# Patient Record
Sex: MALE | Race: WHITE | NOT HISPANIC OR LATINO | URBAN - METROPOLITAN AREA
[De-identification: names, ages, dates, MRNs, and addresses within clinical notes are randomized per-mention and may not be internally consistent; named-entity substitution may affect disease eponyms.]

---

## 2018-09-05 ENCOUNTER — TELEPHONE (OUTPATIENT)
Dept: CARDIOLOGY | Facility: CLINIC | Age: 58
End: 2018-09-05

## 2018-09-05 NOTE — TELEPHONE ENCOUNTER
09/05/18  1:02 PM  Cody Ferrer  1960    Home Phone [phone]       Cody Ferrer is a past patient of Dr Valentine.  Faxed EKG received from [facility] in [place].  They are asking for Dr Valentine to do an over read and advise.    Tried calling [phone] with no answer and no voicemail.  Faxed Dr Valentine's recommendation to [phone] with instruction that patient didn't need to go to the ER, but Dr Valentine would like him to make an apt.  Patient has not been seen since 2015.    Rubi Sunshine RN  Triage nurse

## 2018-09-06 ENCOUNTER — OFFICE VISIT (OUTPATIENT)
Dept: CARDIOLOGY | Facility: CLINIC | Age: 58
End: 2018-09-06

## 2018-09-06 VITALS — HEIGHT: 72 IN

## 2018-09-06 VITALS
BODY MASS INDEX: 29.26 KG/M2 | SYSTOLIC BLOOD PRESSURE: 136 MMHG | DIASTOLIC BLOOD PRESSURE: 70 MMHG | WEIGHT: 216 LBS | HEART RATE: 91 BPM | HEIGHT: 72 IN

## 2018-09-06 DIAGNOSIS — I48.91 ATRIAL FIBRILLATION, UNSPECIFIED TYPE (HCC): Primary | ICD-10-CM

## 2018-09-06 DIAGNOSIS — I48.3 TYPICAL ATRIAL FLUTTER (HCC): ICD-10-CM

## 2018-09-06 PROCEDURE — 93000 ELECTROCARDIOGRAM COMPLETE: CPT | Performed by: INTERNAL MEDICINE

## 2018-09-06 PROCEDURE — 99204 OFFICE O/P NEW MOD 45 MIN: CPT | Performed by: INTERNAL MEDICINE

## 2018-09-06 RX ORDER — LISINOPRIL 10 MG/1
10 TABLET ORAL 2 TIMES DAILY
COMMUNITY

## 2018-09-06 RX ORDER — ATENOLOL 50 MG/1
50 TABLET ORAL DAILY
COMMUNITY
End: 2019-12-20 | Stop reason: ALTCHOICE

## 2018-09-06 RX ORDER — ESOMEPRAZOLE MAGNESIUM 40 MG/1
40 CAPSULE, DELAYED RELEASE ORAL
COMMUNITY

## 2018-09-06 NOTE — PROGRESS NOTES
Subjective:     Encounter Date:09/06/2018      Patient ID: Cody Ferrer is a 58 y.o. male.    Chief Complaint: atrial fibrillation/flutter    History of Present Illness    Dear Dr. Smith,     I had the pleasure of seeing Cody Fields in cardiac evaluation today.  As you well know, he is a terry 58-year-old male whom I saw several years ago for PVCs.  He is a retired teacher.  He has some hypertension but is otherwise pretty healthy.  He drinks one or two alcoholic beverages a few nights a week.      He was at a health fair and was noticed to have an irregular pulse.  An EKG was performed and he was advised to go to the emergency room.  There he was discovered to be in atrial flutter.      He comes in to see me.  In general he is doing well.  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  He is getting ready for his son's wedding.  He states he gets palpitations at night but they do not really bother him.  He takes an aspirin daily already.          Review of Systems   All other systems reviewed and are negative.    Family History   Problem Relation Age of Onset   • Heart attack Father    • Hypertension Father    • Cancer Father      Social History   Substance Use Topics   • Smoking status: Never Smoker   • Smokeless tobacco: Not on file   • Alcohol use Yes         ECG 12 Lead  Date/Time: 9/6/2018 10:51 AM  Performed by: TORITO GRIFFITH  Authorized by: TORITO GRIFFITH   Previous ECG: no previous ECG available  Rhythm: atrial flutter and atrial fibrillation  BPM: 91                 Objective:     Physical Exam   Constitutional: He is oriented to person, place, and time. He appears well-developed and well-nourished.   HENT:   Head: Normocephalic and atraumatic.   Neck: Normal range of motion. Neck supple.   Cardiovascular: Normal rate and normal heart sounds.  An irregularly irregular rhythm present.   Pulmonary/Chest: Effort normal and breath sounds normal.   Abdominal: Soft. Bowel sounds are  normal.   Musculoskeletal: Normal range of motion.   Neurological: He is alert and oriented to person, place, and time.   Skin: Skin is warm and dry.   Psychiatric: He has a normal mood and affect. His behavior is normal. Thought content normal.   Vitals reviewed.      Lab Review:       Assessment:          Diagnosis Plan   1. Atrial fibrillation, unspecified type (CMS/HCC)     2. Typical atrial flutter (CMS/HCC)            Plan:       It was a pleasure to see your patient today in cardiac evaluation.  He is a terry 58-year-old male with atrial fibrillation/flutter.  I spoke with him about options for care.  I spend over 60 minutes in direct contact with the patient regarding his options.  For now, he would like to think about things.  He will contact me if he decides to go on anticoagulation.  After that, we can decide about other options for treatment.      Atrial Fibrillation and Atrial Flutter  Assessment  • The patient has atrial fibrillation-initial episode and atrial flutter-initial episode  • This is non-valvular in etiology  • The patient's CHADS2-VASc score is 1  • A [id number] score of 1 is considered an intermediate risk for a thromboembolic event  • Aspirin prescribed    Plan  • Continue aspirin for antithrombotic therapy, bleeding issues discussed

## 2018-12-13 ENCOUNTER — OFFICE VISIT (OUTPATIENT)
Dept: CARDIOLOGY | Facility: CLINIC | Age: 58
End: 2018-12-13

## 2018-12-13 VITALS
HEART RATE: 98 BPM | HEIGHT: 72 IN | DIASTOLIC BLOOD PRESSURE: 84 MMHG | WEIGHT: 220 LBS | SYSTOLIC BLOOD PRESSURE: 136 MMHG | BODY MASS INDEX: 29.8 KG/M2

## 2018-12-13 DIAGNOSIS — I48.19 PERSISTENT ATRIAL FIBRILLATION (HCC): ICD-10-CM

## 2018-12-13 DIAGNOSIS — I48.3 TYPICAL ATRIAL FLUTTER (HCC): Primary | ICD-10-CM

## 2018-12-13 PROCEDURE — 93000 ELECTROCARDIOGRAM COMPLETE: CPT | Performed by: INTERNAL MEDICINE

## 2018-12-13 PROCEDURE — 99213 OFFICE O/P EST LOW 20 MIN: CPT | Performed by: INTERNAL MEDICINE

## 2018-12-13 NOTE — PROGRESS NOTES
Subjective:     Encounter Date:12/13/2018      Patient ID: Cody Ferrer is a 58 y.o. male.    Chief Complaint: AF/flutter    History of Present Illness    Dear Dr. mSith,     I had the pleasure of seeing Cody Ferrer in cardiac follow-up today.  As you well know, he is a terry, 58-year-old man with a history of atrial fibrillation/flutter.  He was diagnosed several months ago at a health fair, though he may have had the rhythm for much longer than that.  He was anticoagulated with apixaban and really has done well with that.  He has had no bleeding complications.  He has no symptoms of palpitations unless he really tries to pay attention.  Overall, he has got a great quality of life without any limitation.                   Review of Systems   All other systems reviewed and are negative.        ECG 12 Lead  Date/Time: 12/13/2018 1:04 PM  Performed by: Amando Valentine MD  Authorized by: Amando Valentine MD   Comparison: compared with previous ECG   Similar to previous ECG  Rhythm: atrial flutter and atrial fibrillation  BPM: 98                 Objective:     Physical Exam   Constitutional: He is oriented to person, place, and time. He appears well-developed and well-nourished.   HENT:   Head: Normocephalic and atraumatic.   Neck: Normal range of motion. Neck supple.   Cardiovascular: Normal rate and normal heart sounds. An irregularly irregular rhythm present.   Pulmonary/Chest: Effort normal and breath sounds normal.   Abdominal: Soft. Bowel sounds are normal.   Musculoskeletal: Normal range of motion.   Neurological: He is alert and oriented to person, place, and time.   Skin: Skin is warm and dry.   Psychiatric: He has a normal mood and affect. His behavior is normal. Thought content normal.   Vitals reviewed.      Lab Review:       Assessment:          Diagnosis Plan   1. Typical atrial flutter (CMS/HCC)     2. Persistent atrial fibrillation (CMS/HCC)            Plan:       It was a pleasure to see your patient in  cardiac followup today.  He is doing well from the cardiac standpoint without any complaints.  We talked about the range of possible interventions for atrial fibrillation and flutter.  He is okay with staying on anticoagulation.  I think his lifestyle is pretty good so I do not think additional modifications need to be made.  He could undergo cardioversion and then we will see if his atrial fibrillation recurs.  We also talked about the role of antiarrhythmic medications as well as ablation.      For now, he would like to do nothing further as he is asymptomatic.  He will see me again in six months or sooner if symptoms warrant.          Atrial Fibrillation and Atrial Flutter  Assessment  • The patient has persistent atrial fibrillation and atrial flutter-initial episode  • This is non-valvular in etiology  • The patient's CHADS2-VASc score is 1  • A [id number] score of 1 is considered an intermediate risk for a thromboembolic event  • Aspirin prescribed    Plan  • Continue in atrial fibrillation with rate control  • Continue aspirin for antithrombotic therapy, bleeding issues discussed  • Continue beta blocker for rate control

## 2019-04-05 ENCOUNTER — TELEPHONE (OUTPATIENT)
Dept: CARDIOLOGY | Facility: CLINIC | Age: 59
End: 2019-04-05

## 2019-04-05 NOTE — TELEPHONE ENCOUNTER
Patient called stating that he now has a copay card for Eliquis and it will only cost $10/month. He requested a new RX be sent to [facility] in [facility].     RX sent to pharmacy.

## 2019-05-17 ENCOUNTER — TELEPHONE (OUTPATIENT)
Dept: CARDIOLOGY | Facility: CLINIC | Age: 59
End: 2019-05-17

## 2019-05-17 NOTE — TELEPHONE ENCOUNTER
"05/17/19  9:30 AM  Cody Ferrer  1960  Home Phone [phone]       Cody Ferrer is a patient of Dr. Valentine, calling in w/c/o hypertension, L jaw pain (\"feels like his teeth are not lined up correctly\"), and a \"terrible HA\".  Current BP are 186/120 and 154/101. He did not have the HR readings w/these BPs.    Current cardiac meds are:    Atenolol 50mg daily  Lisinopril 10mg daily  Apixaban 5mg BID    Pt denies any edema or chest pain.    He is a [facility] pt, and wants to come down here, if he needs to go to the ER.    Avis: Can you please address this for Dr. Valentine? How would you like to proceed?    Mary Kramer  Triage RN    "

## 2019-05-17 NOTE — TELEPHONE ENCOUNTER
I spoke with him.  He has not taken his lisinopril this morning, he usually takes it in the evening.  I asked him to go ahead and take his lisinopril.  He is going to be seeing his primary care physician in 30 minutes.

## 2019-05-28 NOTE — TELEPHONE ENCOUNTER
EKG faxed over from [facility].  I guess they did one at PCP office.  I have placed in your INBOX for review.    Let me know if there's anything I need to do.    Thanks!

## 2019-06-13 ENCOUNTER — OFFICE VISIT (OUTPATIENT)
Dept: CARDIOLOGY | Facility: CLINIC | Age: 59
End: 2019-06-13

## 2019-06-13 VITALS
WEIGHT: 221 LBS | SYSTOLIC BLOOD PRESSURE: 150 MMHG | HEIGHT: 72 IN | BODY MASS INDEX: 29.93 KG/M2 | HEART RATE: 99 BPM | DIASTOLIC BLOOD PRESSURE: 90 MMHG

## 2019-06-13 DIAGNOSIS — I48.3 TYPICAL ATRIAL FLUTTER (HCC): ICD-10-CM

## 2019-06-13 DIAGNOSIS — I48.19 PERSISTENT ATRIAL FIBRILLATION (HCC): Primary | ICD-10-CM

## 2019-06-13 PROCEDURE — 93000 ELECTROCARDIOGRAM COMPLETE: CPT | Performed by: INTERNAL MEDICINE

## 2019-06-13 PROCEDURE — 99213 OFFICE O/P EST LOW 20 MIN: CPT | Performed by: INTERNAL MEDICINE

## 2019-06-13 RX ORDER — ATENOLOL 50 MG/1
100 TABLET ORAL 2 TIMES DAILY
COMMUNITY
End: 2020-12-04 | Stop reason: SDUPTHER

## 2019-06-13 NOTE — PROGRESS NOTES
Subjective:     Encounter Date:06/13/2019      Patient ID: Cody Ferrer is a 59 y.o. male.    Chief Complaint: AF/flutter    History of Present Illness    Dear Dr. Smith,    I had the pleasure of seeing the patient in cardiac followup today. As you well know, he is a terry 59-year-old man with history of chronic atrial fibrillation/flutter. He takes anticoagulation without difficulty for this.     Recently he had an episode of transient high blood pressure. He increased the dose of his beta blocker which helped quite a bit.    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         Review of Systems   All other systems reviewed and are negative.        ECG 12 Lead  Date/Time: 6/13/2019 10:26 AM  Performed by: Amando Valentine MD  Authorized by: Amando Valentine MD   Comparison: compared with previous ECG   Similar to previous ECG  Rhythm: atrial fibrillation  BPM: 99  Other findings: early transition               Objective:     Physical Exam   Constitutional: He is oriented to person, place, and time. He appears well-developed and well-nourished.   HENT:   Head: Normocephalic and atraumatic.   Neck: Normal range of motion. Neck supple.   Cardiovascular: Normal rate and normal heart sounds. An irregularly irregular rhythm present.   Pulmonary/Chest: Effort normal and breath sounds normal.   Abdominal: Soft. Bowel sounds are normal.   Musculoskeletal: Normal range of motion.   Neurological: He is alert and oriented to person, place, and time.   Skin: Skin is warm and dry.   Psychiatric: He has a normal mood and affect. His behavior is normal. Thought content normal.   Vitals reviewed.      Lab Review:       Assessment:          Diagnosis Plan   1. Persistent atrial fibrillation (CMS/HCC)     2. Typical atrial flutter (CMS/HCC)            Plan:       It was a pleasure to see your patient in cardiac followup today. He is doing quite well from the cardiac standpoint without any complaints. His blood pressure is  much better controlled. I have recommended no changes. He will see us again in 6 months or sooner if symptoms warrant.         Atrial Fibrillation and Atrial Flutter  Assessment  • The patient has persistent atrial fibrillation and atrial flutter-initial episode  • This is non-valvular in etiology  • The patient's CHADS2-VASc score is 1  • A [id number]-[id number] score of 1 is considered an intermediate risk for a thromboembolic event  • Aspirin prescribed    Plan  • Continue in atrial fibrillation with rate control  • Continue aspirin for antithrombotic therapy, bleeding issues discussed  • Continue beta blocker for rate control

## 2019-10-23 RX ORDER — APIXABAN 5 MG/1
TABLET, FILM COATED ORAL
Qty: 180 TABLET | Refills: 0 | Status: SHIPPED | OUTPATIENT
Start: 2019-10-23 | End: 2019-12-20 | Stop reason: SDUPTHER

## 2019-12-03 ENCOUNTER — TELEPHONE (OUTPATIENT)
Dept: CARDIOLOGY | Facility: CLINIC | Age: 59
End: 2019-12-03

## 2019-12-03 NOTE — TELEPHONE ENCOUNTER
Patient called today, stating that he was recently at [facility] for blood pressure issues and some numbness in his hands.  He would like for you to review testing and call to discuss.    Records from [facility] have been printed and placed in your INBOX for review.    Thanks!

## 2019-12-03 NOTE — TELEPHONE ENCOUNTER
Called and went over work up.  He wonders if hydralazine caused his presenting symptoms.  I asked him to hold off on taking any further hydralazine, continue with lisinopril and atenolol for now, and keep a BP log until he sees me in the office on 12/20.

## 2019-12-20 ENCOUNTER — OFFICE VISIT (OUTPATIENT)
Dept: CARDIOLOGY | Facility: CLINIC | Age: 59
End: 2019-12-20

## 2019-12-20 VITALS
HEIGHT: 72 IN | WEIGHT: 222 LBS | DIASTOLIC BLOOD PRESSURE: 108 MMHG | BODY MASS INDEX: 30.07 KG/M2 | HEART RATE: 67 BPM | SYSTOLIC BLOOD PRESSURE: 162 MMHG

## 2019-12-20 DIAGNOSIS — I49.3 BEAT, PREMATURE VENTRICULAR: ICD-10-CM

## 2019-12-20 DIAGNOSIS — I48.0 PAROXYSMAL ATRIAL FIBRILLATION (HCC): Primary | ICD-10-CM

## 2019-12-20 DIAGNOSIS — I10 ESSENTIAL HYPERTENSION: ICD-10-CM

## 2019-12-20 DIAGNOSIS — Z79.01 CHRONIC ANTICOAGULATION: ICD-10-CM

## 2019-12-20 PROBLEM — R94.39 ABNORMAL CARDIOVASCULAR STRESS TEST: Status: RESOLVED | Noted: 2019-12-20 | Resolved: 2019-12-20

## 2019-12-20 PROBLEM — R94.39 ABNORMAL CARDIOVASCULAR STRESS TEST: Status: ACTIVE | Noted: 2019-12-20

## 2019-12-20 PROCEDURE — 99214 OFFICE O/P EST MOD 30 MIN: CPT | Performed by: INTERNAL MEDICINE

## 2019-12-20 PROCEDURE — 93000 ELECTROCARDIOGRAM COMPLETE: CPT | Performed by: INTERNAL MEDICINE

## 2019-12-20 RX ORDER — HYDRALAZINE HYDROCHLORIDE 100 MG/1
TABLET, FILM COATED ORAL
COMMUNITY
Start: 2019-11-27 | End: 2019-12-20 | Stop reason: ALTCHOICE

## 2019-12-20 NOTE — PROGRESS NOTES
Subjective:     Encounter Date:12/20/2019      Patient ID: Cody Ferrer is a 59 y.o. male.    Chief Complaint:  History of Present Illness    This is a 59-year-old with a history of paroxysmal atrial fibrillation, hypertension, who presents for follow-up.    Patient was previously followed by Dr. Valentine.  He 2 in 8/2014 when he presented for symptoms of palpitations.  He had a Holter monitor performed around that time that showed frequent PVCs.  He ultimately underwent an echocardiogram and a stress test that were both unremarkable.  Between 6/2015 and 9/2018 the patient was not seen again in our office.  He saw Dr. Valentine again in 9/2018 after an EKG performed at a health [facility] revealed evidence of atrial fibrillation.  Patient was relatively asymptomatic except for some occasional palpitations at night.  He was already rate controlled on atenolol.  He was started on apixaban for anticoagulation.  He was seen 2 additional times in 12/2018 and then again in 6/2019 and was noted to be in atrial fibrillation each of those times.  They did discuss several different options for treatment of his atrial fibrillation including at his most recent office visit with Dr. Valentine in 6/2019 but opted to continue with rate control and anticoagulation since he was asymptomatic.    He presents today for routine follow-up.  Earlier this month the patient was admitted to [facility] after having symptoms of dyspnea on exertion and flushing feeling.  He stated that he was started on hydralazine for uncontrolled blood pressures.  His work-up during his hospitalization was unremarkable including an echocardiogram that showed systolic function and wall motion with an estimated ejection fraction of 55 to 60%, no significant valvular disease, and normal diastolic function.  His stress test was negative for ischemia.  It was felt that his symptoms may have been 2 reaction to hydralazine.  He is remained off of that medication since.  He  has been monitoring his blood pressures at home and his systolic pressures have been ranging from the 130s to 150s with diastolics in the 70s 80s.  For the most part his blood pressures appear to be running in the 130s over 80s.  He does report that he took his blood pressure cuff into ' run higher than the office cuff.  He denies any further symptoms since his discharge from the hospital.  He denies any recent weight gain.  Denies any chest pain, dyspnea on exertion, orthopnea, near-syncope or syncope, or lower extremity edema.  On occasion he has some palpitations at night but this is chronic and unchanged.  He and his wife are taking a trip to [place] and [place] early next month.  He denies any bleeding issues.    Review of Systems   Constitution: Negative for malaise/fatigue.   HENT: Negative for hearing loss, hoarse voice, nosebleeds and sore throat.    Eyes: Negative for pain.   Cardiovascular: Negative for chest pain, claudication, cyanosis, dyspnea on exertion, irregular heartbeat, leg swelling, near-syncope, orthopnea, palpitations, paroxysmal nocturnal dyspnea and syncope.   Respiratory: Negative for shortness of breath and snoring.    Endocrine: Negative for cold intolerance, heat intolerance, polydipsia, polyphagia and polyuria.   Skin: Negative for itching and rash.   Musculoskeletal: Negative for arthritis, falls, joint pain, joint swelling, muscle cramps, muscle weakness and myalgias.   Gastrointestinal: Negative for constipation, diarrhea, dysphagia, heartburn, hematemesis, hematochezia, melena, nausea and vomiting.   Genitourinary: Negative for frequency, hematuria and hesitancy.   Neurological: Negative for excessive daytime sleepiness, dizziness, headaches, light-headedness, numbness and weakness.   Psychiatric/Behavioral: Negative for depression. The patient is not nervous/anxious.          Current Outpatient Medications:   •  apixaban (ELIQUIS) 5 MG tablet tablet, Take 1 tablet  "by mouth Every 12 (Twelve) Hours., Disp: 180 tablet, Rfl: 2  •  atenolol (TENORMIN) 50 MG tablet, Take 100 mg by mouth 2 (Two) Times a Day., Disp: , Rfl:   •  esomeprazole (nexIUM) 40 MG capsule, Take 40 mg by mouth Every Morning Before Breakfast., Disp: , Rfl:   •  lisinopril (PRINIVIL,ZESTRIL) 10 MG tablet, Take 20 mg by mouth 2 (Two) Times a Day., Disp: , Rfl:     Past Medical History:   Diagnosis Date   • HTN (hypertension)    • Hypertension        Past Surgical History:   Procedure Laterality Date   • HERNIA REPAIR     • HERNIA REPAIR      as a child       Family History   Problem Relation Age of Onset   • Heart attack Father    • Hypertension Father    • Cancer Father        Social History     Tobacco Use   • Smoking status: Never Smoker   Substance Use Topics   • Alcohol use: Yes   • Drug use: No         ECG 12 Lead  Date/Time: 12/20/2019 12:16 PM  Performed by: Paola Howard MD  Authorized by: Paola Howard MD   Comparison: compared with previous ECG   Comparison to previous ECG: Atrial fibrillation is no longer present  Rhythm: sinus rhythm  Ectopy: atrial premature contractions  Comments: Borderline prolonged QT               Objective:     Visit Vitals  BP (!) 162/108   Pulse 67   Ht 182.9 cm (72\")   Wt 101 kg (222 lb)   BMI 30.11 kg/m²         Physical Exam   Constitutional: He is oriented to person, place, and time. He appears well-developed and well-nourished.   HENT:   Head: Normocephalic and atraumatic.   Neck: No JVD present. Carotid bruit is not present.   Cardiovascular: Normal rate, regular rhythm, S1 normal and S2 normal. Exam reveals no gallop.   No murmur heard.  Pulses:       Radial pulses are 2+ on the right side, and 2+ on the left side.   No bilateral lower extremity edema   Pulmonary/Chest: Effort normal and breath sounds normal.   Abdominal: Soft. Normal appearance.   Neurological: He is alert and oriented to person, place, and time.   Skin: Skin is warm, dry and intact. "   Psychiatric: He has a normal mood and affect.       Lab Review:       Assessment:          Diagnosis Plan   1. Paroxysmal atrial fibrillation (CMS/HCC)  apixaban (ELIQUIS) 5 MG tablet tablet   2. Essential hypertension     3. Beat, premature ventricular     4. Chronic anticoagulation            Plan:       1.  Hypertension.  Blood pressures are elevated in the office today.  At home his blood pressures do not appear quite as bad.  Since for the most part his blood pressures appear to be running in the 130s over 80s at home I like to just monitor his blood pressures on his current regimen.  If his blood pressures continue to be an issue I would consider trying amlodipine next.  2.  Paroxysmal atrial fibrillation.  Interestingly the patient was felt to be in persistent atrial fibrillation earlier this year since he had 3 different office visits over the course of 9 months at which each time he was noted to be in atrial fibrillation.  However today he appears to be in sinus rhythm with frequent PACs and this is similar to his EKGs during his recent admission earlier this month.  He continues to have a CHADS-VASc score of 1.  For now I would recommend that he continue with the apixaban for anticoagulation.  We can readdress this in the future as needed.  3.  History of PVCs.    We will plan on seeing the patient back again in 3 months.

## 2020-06-05 ENCOUNTER — OFFICE VISIT (OUTPATIENT)
Dept: CARDIOLOGY | Facility: CLINIC | Age: 60
End: 2020-06-05

## 2020-06-05 VITALS
HEIGHT: 72 IN | HEART RATE: 67 BPM | DIASTOLIC BLOOD PRESSURE: 100 MMHG | OXYGEN SATURATION: 98 % | WEIGHT: 220 LBS | BODY MASS INDEX: 29.8 KG/M2 | SYSTOLIC BLOOD PRESSURE: 150 MMHG

## 2020-06-05 DIAGNOSIS — I49.3 BEAT, PREMATURE VENTRICULAR: ICD-10-CM

## 2020-06-05 DIAGNOSIS — Z79.01 CHRONIC ANTICOAGULATION: ICD-10-CM

## 2020-06-05 DIAGNOSIS — I48.0 PAROXYSMAL ATRIAL FIBRILLATION (HCC): Primary | ICD-10-CM

## 2020-06-05 DIAGNOSIS — I10 ESSENTIAL HYPERTENSION: ICD-10-CM

## 2020-06-05 PROCEDURE — 93000 ELECTROCARDIOGRAM COMPLETE: CPT | Performed by: INTERNAL MEDICINE

## 2020-06-05 PROCEDURE — 99214 OFFICE O/P EST MOD 30 MIN: CPT | Performed by: INTERNAL MEDICINE

## 2020-06-05 RX ORDER — TAMSULOSIN HYDROCHLORIDE 0.4 MG/1
0.04 CAPSULE ORAL DAILY
COMMUNITY
Start: 2020-05-11

## 2020-06-05 NOTE — PROGRESS NOTES
Subjective:     Encounter Date:06/05/2020      Patient ID: Cody Ferrer is a 60 y.o. male.    Chief Complaint:  History of Present Illness    This is a 60-year-old with a history of paroxysmal atrial fibrillation, hypertension, who presents for follow-up.    He presents today for routine follow-up.  He denies any recent episodes of atrial fibrillation that he is aware of.  He reports that his pressures at home have remained in the 130s over 80s.  When he was seen by Dr. Smith recently he was noted to have elevated blood pressures and was also complaining about issues with urination so he was started on tamsulosin to see if this would help with both his prostate issues and his blood pressures.  He reports that he has not noticed any significant change in his blood pressures with the addition of tamsulosin.  He continues to exercise without any significant issues.  He denies any chest pain, shortness of breath, orthopnea, palpitations, near-syncope or syncope, or lower extremity edema.      Prior History:  The patient was previously followed by Dr. Valentine.  He 2 in 8/2014 when he presented for symptoms of palpitations.  He had a Holter monitor performed around that time that showed frequent PVCs.  He ultimately underwent an echocardiogram and a stress test that were both unremarkable.  Between 6/2015 and 9/2018 the patient was not seen again in our office.  He saw Dr. Valentine again in 9/2018 after an EKG performed at a health [facility] revealed evidence of atrial fibrillation.  Patient was relatively asymptomatic except for some occasional palpitations at night.  He was already rate controlled on atenolol.  He was started on apixaban for anticoagulation.  He was seen 2 additional times in 12/2018 and then again in 6/2019 and was noted to be in atrial fibrillation each of those times.  They did discuss several different options for treatment of his atrial fibrillation including at his most recent office visit with Dr. Valentine in  6/2019 but opted to continue with rate control and anticoagulation since he was asymptomatic.     In 12/2019 he was admitted to [facility] after having symptoms of dyspnea on exertion and flushing feeling.  He stated that he was started on hydralazine for uncontrolled blood pressures.  His work-up during his hospitalization was unremarkable including an echocardiogram that showed systolic function and wall motion with an estimated ejection fraction of 55 to 60%, no significant valvular disease, and normal diastolic function.  His stress test was negative for ischemia.  It was felt that his symptoms were secondary to a reaction to hydralazine.  The medication was stopped at that time and his blood pressures were doing well at home.     Review of Systems   Constitution: Negative for malaise/fatigue.   HENT: Negative for hearing loss, hoarse voice, nosebleeds and sore throat.    Eyes: Negative for pain.   Cardiovascular: Negative for chest pain, claudication, cyanosis, dyspnea on exertion, irregular heartbeat, leg swelling, near-syncope, orthopnea, palpitations, paroxysmal nocturnal dyspnea and syncope.   Respiratory: Negative for shortness of breath and snoring.    Endocrine: Negative for cold intolerance, heat intolerance, polydipsia, polyphagia and polyuria.   Skin: Negative for itching and rash.   Musculoskeletal: Negative for arthritis, falls, joint pain, joint swelling, muscle cramps, muscle weakness and myalgias.   Gastrointestinal: Negative for constipation, diarrhea, dysphagia, heartburn, hematemesis, hematochezia, melena, nausea and vomiting.   Genitourinary: Negative for frequency, hematuria and hesitancy.   Neurological: Negative for excessive daytime sleepiness, dizziness, headaches, light-headedness, numbness and weakness.   Psychiatric/Behavioral: Negative for depression. The patient is not nervous/anxious.          Current Outpatient Medications:   •  apixaban (ELIQUIS) 5 MG tablet tablet,  "Take 1 tablet by mouth Every 12 (Twelve) Hours., Disp: 180 tablet, Rfl: 2  •  atenolol (TENORMIN) 50 MG tablet, Take 100 mg by mouth 2 (Two) Times a Day., Disp: , Rfl:   •  esomeprazole (nexIUM) 40 MG capsule, Take 40 mg by mouth Every Morning Before Breakfast., Disp: , Rfl:   •  lisinopril (PRINIVIL,ZESTRIL) 10 MG tablet, Take 20 mg by mouth 2 (Two) Times a Day., Disp: , Rfl:     Past Medical History:   Diagnosis Date   • HTN (hypertension)    • Hypertension        Past Surgical History:   Procedure Laterality Date   • HERNIA REPAIR     • HERNIA REPAIR      as a child       Family History   Problem Relation Age of Onset   • Heart attack Father    • Hypertension Father    • Cancer Father        Social History     Tobacco Use   • Smoking status: Never Smoker   Substance Use Topics   • Alcohol use: Yes   • Drug use: No         ECG 12 Lead  Date/Time: 6/5/2020 1:00 PM  Performed by: Paola Howard MD  Authorized by: Paola Howard MD   Comparison: compared with previous ECG   Similar to previous ECG  Rhythm: sinus rhythm               Objective:     Visit Vitals  /100 (BP Location: Left arm, Patient Position: Sitting)   Pulse 67   Ht 182.9 cm (72\")   Wt 99.8 kg (220 lb)   SpO2 98%   BMI 29.84 kg/m²         Physical Exam   Constitutional: He is oriented to person, place, and time. He appears well-developed and well-nourished.   HENT:   Head: Normocephalic and atraumatic.   Neck: No JVD present. Carotid bruit is not present.   Cardiovascular: Normal rate, regular rhythm, S1 normal and S2 normal. Exam reveals no gallop.   No murmur heard.  Pulses:       Radial pulses are 2+ on the right side, and 2+ on the left side.   Pulmonary/Chest: Effort normal and breath sounds normal.   Abdominal: Soft. Normal appearance.   Musculoskeletal: He exhibits no edema.   Neurological: He is alert and oriented to person, place, and time.   Skin: Skin is warm, dry and intact.   Psychiatric: He has a normal mood and affect.         "   Assessment:          Diagnosis Plan   1. Paroxysmal atrial fibrillation (CMS/HCC)     2. Essential hypertension     3. Beat, premature ventricular     4. Chronic anticoagulation            Plan:       1.  Paroxysmal atrial fibrillation.  No recent episodes.  He was last known to be in atrial fibrillation last year.  Continue management with atenolol and anticoagulation with apixaban.  2.  Hypertension.  Elevated in the office but it sounds like it is well controlled at home.  He appears to have some whitecoat hypertension.  Continue current management.  3.  PVCs.  None present on his EKG today.    We will plan on seeing the patient back again in 6 months.

## 2020-11-17 DIAGNOSIS — I48.0 PAROXYSMAL ATRIAL FIBRILLATION (HCC): ICD-10-CM

## 2020-11-17 RX ORDER — APIXABAN 5 MG/1
TABLET, FILM COATED ORAL
Qty: 180 TABLET | Refills: 0 | Status: SHIPPED | OUTPATIENT
Start: 2020-11-17 | End: 2020-12-15

## 2020-12-01 RX ORDER — ATENOLOL 100 MG/1
100 TABLET ORAL
COMMUNITY
Start: 2020-09-10

## 2020-12-04 ENCOUNTER — OFFICE VISIT (OUTPATIENT)
Dept: CARDIOLOGY | Facility: CLINIC | Age: 60
End: 2020-12-04

## 2020-12-04 VITALS
DIASTOLIC BLOOD PRESSURE: 82 MMHG | WEIGHT: 221 LBS | HEIGHT: 72 IN | BODY MASS INDEX: 29.93 KG/M2 | HEART RATE: 59 BPM | SYSTOLIC BLOOD PRESSURE: 140 MMHG

## 2020-12-04 DIAGNOSIS — I49.3 BEAT, PREMATURE VENTRICULAR: Primary | ICD-10-CM

## 2020-12-04 DIAGNOSIS — I10 ESSENTIAL HYPERTENSION: ICD-10-CM

## 2020-12-04 DIAGNOSIS — Z79.01 CHRONIC ANTICOAGULATION: ICD-10-CM

## 2020-12-04 DIAGNOSIS — I48.0 PAROXYSMAL ATRIAL FIBRILLATION (HCC): ICD-10-CM

## 2020-12-04 PROCEDURE — 99214 OFFICE O/P EST MOD 30 MIN: CPT | Performed by: INTERNAL MEDICINE

## 2020-12-04 PROCEDURE — 93000 ELECTROCARDIOGRAM COMPLETE: CPT | Performed by: INTERNAL MEDICINE

## 2020-12-04 NOTE — PROGRESS NOTES
Subjective:     Encounter Date:12/04/2020      Patient ID: Cody Ferrer is a 60 y.o. male.    Chief Complaint:  History of Present Illness    This is a 60-year-old with a history of paroxysmal atrial fibrillation, hypertension, who presents for follow-up.     He presents today for routine 6-month follow-up.  He continues to feel well.  Denies any chest pain, shortness of breath, palpitations, orthopnea, near-syncope or syncope, or lower extremity edema.  He continues to exercise on a regular basis without any significant issues.  He denies any bleeding issues.  For the most part his blood pressures remain well controlled with systolics in the 110s to 120s recently.  He did have one reading of around 144/92 which he reports is the highest he has measured.        Prior History:  The patient was previously followed by Dr. Valentine.  He 2 in 8/2014 when he presented for symptoms of palpitations.  He had a Holter monitor performed around that time that showed frequent PVCs.  He ultimately underwent an echocardiogram and a stress test that were both unremarkable.  Between 6/2015 and 9/2018 the patient was not seen again in our office.  He saw Dr. Valentine again in 9/2018 after an EKG performed at a health [facility] revealed evidence of atrial fibrillation.  Patient was relatively asymptomatic except for some occasional palpitations at night.  He was already rate controlled on atenolol.  He was started on apixaban for anticoagulation.  He was seen 2 additional times in 12/2018 and then again in 6/2019 and was noted to be in atrial fibrillation each of those times.  They did discuss several different options for treatment of his atrial fibrillation including at his most recent office visit with Dr. Valentine in 6/2019 but opted to continue with rate control and anticoagulation since he was asymptomatic.     In 12/2019 he was admitted to [facility] after having symptoms of dyspnea on exertion and flushing feeling.  He stated that  he was started on hydralazine for uncontrolled blood pressures.  His work-up during his hospitalization was unremarkable including an echocardiogram that showed systolic function and wall motion with an estimated ejection fraction of 55 to 60%, no significant valvular disease, and normal diastolic function.  His stress test was negative for ischemia.  It was felt that his symptoms were secondary to a reaction to hydralazine.  The medication was stopped at that time and his blood pressures were doing well at home.     I begin following the patient in 6/2020.  At that office visit he reported that his blood pressures were doing well off of the hydralazine with systolics running in the 130s.  Prior to that office visit he was having some issues with urination so he was started on tamsulosin.  He denied any significant changes in his blood pressures with the addition of this medication.       Review of Systems   Constitution: Negative for malaise/fatigue.   HENT: Negative for hearing loss, hoarse voice, nosebleeds and sore throat.    Eyes: Negative for pain.   Cardiovascular: Negative for chest pain, claudication, cyanosis, dyspnea on exertion, irregular heartbeat, leg swelling, near-syncope, orthopnea, palpitations, paroxysmal nocturnal dyspnea and syncope.   Respiratory: Negative for shortness of breath and snoring.    Endocrine: Negative for cold intolerance, heat intolerance, polydipsia, polyphagia and polyuria.   Skin: Negative for itching and rash.   Musculoskeletal: Negative for arthritis, falls, joint pain, joint swelling, muscle cramps, muscle weakness and myalgias.   Gastrointestinal: Negative for constipation, diarrhea, dysphagia, heartburn, hematemesis, hematochezia, melena, nausea and vomiting.   Genitourinary: Negative for frequency, hematuria and hesitancy.   Neurological: Negative for excessive daytime sleepiness, dizziness, headaches, light-headedness, numbness and weakness.   Psychiatric/Behavioral:  "Negative for depression. The patient is not nervous/anxious.          Current Outpatient Medications:   •  atenolol (TENORMIN) 100 MG tablet, 100 mg. TAKE 100 MG BY MOUTH 2 TIMES A DAY, Disp: , Rfl:   •  Eliquis 5 MG tablet tablet, TAKE ONE TABLET BY MOUTH EVERY 12 HOURS, Disp: 180 tablet, Rfl: 0  •  esomeprazole (nexIUM) 40 MG capsule, Take 40 mg by mouth Every Morning Before Breakfast., Disp: , Rfl:   •  lisinopril (PRINIVIL,ZESTRIL) 10 MG tablet, Take 20 mg by mouth 2 (Two) Times a Day., Disp: , Rfl:   •  tamsulosin (FLOMAX) 0.4 MG capsule 24 hr capsule, Take 0.04 mg by mouth Daily., Disp: , Rfl:     Past Medical History:   Diagnosis Date   • HTN (hypertension)    • Hypertension        Past Surgical History:   Procedure Laterality Date   • HERNIA REPAIR     • HERNIA REPAIR      as a child       Family History   Problem Relation Age of Onset   • Heart attack Father    • Hypertension Father    • Cancer Father        Social History     Tobacco Use   • Smoking status: Never Smoker   • Smokeless tobacco: Never Used   Substance Use Topics   • Alcohol use: Yes   • Drug use: No         ECG 12 Lead    Date/Time: 12/4/2020 11:25 AM  Performed by: Paola Howard MD  Authorized by: Paola Howard MD   Comparison: compared with previous ECG   Similar to previous ECG  Rhythm: sinus rhythm               Objective:     Visit Vitals  /82   Pulse 59   Ht 182.9 cm (72\")   Wt 100 kg (221 lb)   BMI 29.97 kg/m²         Constitutional:       Appearance: Normal appearance. Well-developed.   Eyes:      General: Lids are normal.      Conjunctiva/sclera: Conjunctivae normal.      Pupils: Pupils are equal, round, and reactive to light.   HENT:      Head: Normocephalic and atraumatic.   Neck:      Musculoskeletal: Full passive range of motion without pain, normal range of motion and neck supple.      Vascular: No carotid bruit or JVD.      Lymphadenopathy: No cervical adenopathy.   Pulmonary:      Effort: Pulmonary effort is " normal.      Breath sounds: Normal breath sounds.   Cardiovascular:      Normal rate. Regular rhythm.      No gallop.   Pulses:     Radial: 2+ bilaterally.  Edema:     Peripheral edema absent.   Abdominal:      Palpations: Abdomen is soft.   Skin:     General: Skin is warm and dry.   Neurological:      Mental Status: Alert and oriented to person, place, and time.           Assessment:          Diagnosis Plan   1. Beat, premature ventricular     2. Paroxysmal atrial fibrillation (CMS/HCC)     3. Essential hypertension     4. Chronic anticoagulation            Plan:       1.  Paroxysmal atrial fibrillation.  He remains in sinus rhythm today.  Patient did ask if he needs to remain on anticoagulation.  I recommended that we continue anticoagulation since the patient has been asymptomatic with atrial fibrillation in the past and we have no way of knowing when and if he is continuing to have episodes.  Continue current management with atenolol and anticoagulation with apixaban.  2.  Hypertension.  Per the patient's report is well controlled at home.  3.  History of PVCs.  None present on his EKG today.    We will plan on seeing the patient back again in 1 year or sooner if further issues arise.

## 2020-12-15 DIAGNOSIS — I48.0 PAROXYSMAL ATRIAL FIBRILLATION (HCC): ICD-10-CM

## 2020-12-15 RX ORDER — APIXABAN 5 MG/1
TABLET, FILM COATED ORAL
Qty: 180 TABLET | Refills: 2 | Status: SHIPPED | OUTPATIENT
Start: 2020-12-15 | End: 2021-12-10

## 2021-10-04 ENCOUNTER — TELEPHONE (OUTPATIENT)
Dept: CARDIOLOGY | Facility: CLINIC | Age: 61
End: 2021-10-04

## 2021-12-03 ENCOUNTER — OFFICE VISIT (OUTPATIENT)
Dept: CARDIOLOGY | Facility: CLINIC | Age: 61
End: 2021-12-03

## 2021-12-03 VITALS
SYSTOLIC BLOOD PRESSURE: 152 MMHG | HEART RATE: 59 BPM | BODY MASS INDEX: 30.34 KG/M2 | HEIGHT: 72 IN | DIASTOLIC BLOOD PRESSURE: 92 MMHG | WEIGHT: 224 LBS

## 2021-12-03 DIAGNOSIS — I48.0 PAROXYSMAL ATRIAL FIBRILLATION (HCC): Primary | ICD-10-CM

## 2021-12-03 DIAGNOSIS — I10 ESSENTIAL HYPERTENSION: ICD-10-CM

## 2021-12-03 DIAGNOSIS — I49.3 BEAT, PREMATURE VENTRICULAR: ICD-10-CM

## 2021-12-03 DIAGNOSIS — Z79.01 CHRONIC ANTICOAGULATION: ICD-10-CM

## 2021-12-03 PROCEDURE — 93000 ELECTROCARDIOGRAM COMPLETE: CPT | Performed by: INTERNAL MEDICINE

## 2021-12-03 PROCEDURE — 99214 OFFICE O/P EST MOD 30 MIN: CPT | Performed by: INTERNAL MEDICINE

## 2021-12-03 NOTE — PROGRESS NOTES
Subjective:     Encounter Date:12/03/2021      Patient ID: Cody Ferrer is a 61 y.o. male.    Chief Complaint:  History of Present Illness    This is a 60-year-old with a history of paroxysmal atrial fibrillation, hypertension, who presents for follow-up.     He presents today for annual follow-up.  He reported at his last visit that his blood pressures remain well controlled-hydralazine.  Today his blood pressures are elevated in the office.  He reports that when he checks it with his blood pressure cuff at home it is often elevated.  However when he was seen for routine checkup with Dr. Smith recently his blood pressures appear to be normal in the 130s over 80s.     Blood work performed recently that showed that his cholesterol was not well controlled with an LDL of 143.  Since then he has been working on diet and exercise.    He otherwise denies any chest pain, shortness of breath, palpitations, orthopnea, near-syncope or syncope, or lower extremity edema.  Denies any significant bleeding issues with the apixaban.     Prior History:  The patient was previously followed by Dr. Valetnine.  He 2 in 8/2014 when he presented for symptoms of palpitations.  He had a Holter monitor performed around that time that showed frequent PVCs.  He ultimately underwent an echocardiogram and a stress test that were both unremarkable.  Between 6/2015 and 9/2018 the patient was not seen again in our office.  He saw Dr. Valentine again in 9/2018 after an EKG performed at a health fair revealed evidence of atrial fibrillation.  Patient was relatively asymptomatic except for some occasional palpitations at night.  He was already rate controlled on atenolol.  He was started on apixaban for anticoagulation.  He was seen 2 additional times in 12/2018 and then again in 6/2019 and was noted to be in atrial fibrillation each of those times.  They did discuss several different options for treatment of his atrial fibrillation including at his most  recent office visit with Dr. Valentine in 6/2019 but opted to continue with rate control and anticoagulation since he was asymptomatic.     In 12/2019 he was admitted to [facility] after having symptoms of dyspnea on exertion and flushing feeling.  He stated that he was started on hydralazine for uncontrolled blood pressures.  His work-up during his hospitalization was unremarkable including an echocardiogram that showed systolic function and wall motion with an estimated ejection fraction of 55 to 60%, no significant valvular disease, and normal diastolic function.  His stress test was negative for ischemia.  It was felt that his symptoms were secondary to a reaction to hydralazine.  The medication was stopped at that time and his blood pressures were doing well at home.      I begin following the patient in 6/2020.  At that office visit he reported that his blood pressures were doing well off of the hydralazine with systolics running in the 130s.  Prior to that office visit he was having some issues with urination so he was started on tamsulosin.  He denied any significant changes in his blood pressures with the addition of this medication.       Review of Systems   Constitutional: Negative for malaise/fatigue.   HENT: Negative for hearing loss, hoarse voice, nosebleeds and sore throat.    Eyes: Negative for pain.   Cardiovascular: Negative for chest pain, claudication, cyanosis, dyspnea on exertion, irregular heartbeat, leg swelling, near-syncope, orthopnea, palpitations, paroxysmal nocturnal dyspnea and syncope.   Respiratory: Negative for shortness of breath and snoring.    Endocrine: Negative for cold intolerance, heat intolerance, polydipsia, polyphagia and polyuria.   Skin: Negative for itching and rash.   Musculoskeletal: Negative for arthritis, falls, joint pain, joint swelling, muscle cramps, muscle weakness and myalgias.   Gastrointestinal: Negative for constipation, diarrhea, dysphagia,  "heartburn, hematemesis, hematochezia, melena, nausea and vomiting.   Genitourinary: Negative for frequency, hematuria and hesitancy.   Neurological: Negative for excessive daytime sleepiness, dizziness, headaches, light-headedness, numbness and weakness.   Psychiatric/Behavioral: Negative for depression. The patient is not nervous/anxious.          Current Outpatient Medications:   •  atenolol (TENORMIN) 100 MG tablet, 100 mg. TAKE 100 MG BY MOUTH 2 TIMES A DAY, Disp: , Rfl:   •  Eliquis 5 MG tablet tablet, TAKE ONE TABLET BY MOUTH EVERY 12 HOURS, Disp: 180 tablet, Rfl: 2  •  esomeprazole (nexIUM) 40 MG capsule, Take 40 mg by mouth Every Morning Before Breakfast., Disp: , Rfl:   •  lisinopril (PRINIVIL,ZESTRIL) 10 MG tablet, Take 20 mg by mouth 2 (Two) Times a Day., Disp: , Rfl:   •  tamsulosin (FLOMAX) 0.4 MG capsule 24 hr capsule, Take 0.04 mg by mouth Daily., Disp: , Rfl:     Past Medical History:   Diagnosis Date   • HTN (hypertension)    • Hypertension        Past Surgical History:   Procedure Laterality Date   • HERNIA REPAIR     • HERNIA REPAIR      as a child       Family History   Problem Relation Age of Onset   • Heart attack Father    • Hypertension Father    • Cancer Father        Social History     Tobacco Use   • Smoking status: Never Smoker   • Smokeless tobacco: Never Used   Substance Use Topics   • Alcohol use: Yes   • Drug use: No         ECG 12 Lead    Date/Time: 12/3/2021 10:39 AM  Performed by: Paola Howard MD  Authorized by: Paola Howard MD   Comparison: compared with previous ECG   Comparison to previous ECG: Atrial flutter is new  Rhythm: atrial flutter               Objective:     Visit Vitals  /92 (BP Location: Right arm, Patient Position: Sitting, Cuff Size: Large Adult)   Pulse 59   Ht 182.9 cm (72\")   Wt 102 kg (224 lb)   BMI 30.38 kg/m²         Constitutional:       Appearance: Normal appearance. Well-developed.   Eyes:      General: Lids are normal.      " Conjunctiva/sclera: Conjunctivae normal.      Pupils: Pupils are equal, round, and reactive to light.   HENT:      Head: Normocephalic and atraumatic.   Neck:      Vascular: No carotid bruit or JVD.      Lymphadenopathy: No cervical adenopathy.   Pulmonary:      Effort: Pulmonary effort is normal.      Breath sounds: Normal breath sounds.   Cardiovascular:      Normal rate. Irregular rhythm.      No gallop.   Pulses:     Radial: 2+ bilaterally.  Edema:     Peripheral edema absent.   Abdominal:      Palpations: Abdomen is soft.   Musculoskeletal:      Cervical back: Full passive range of motion without pain, normal range of motion and neck supple. Skin:     General: Skin is warm and dry.   Neurological:      Mental Status: Alert and oriented to person, place, and time.             Assessment:          Diagnosis Plan   1. Paroxysmal atrial fibrillation (HCC)     2. Essential hypertension     3. Beat, premature ventricular     4. Chronic anticoagulation            Plan:       1.  Hypertension.  Elevated in the office today.  I rechecked it myself and it was still around 150/80.  Blood pressures have also been running a little high at home but the patient states that he does not really trust his blood pressure cuff at home.  He is already on a maximum dose of both lisinopril and atenolol.  At this point since he is already working on lifestyle changes and in order to avoid adding a third antihypertensive medication I think it is reasonable to monitor his blood pressures and see if they improve with exercise and weight loss before pursuing that route.  Discussed this with the patient and he agrees with this plan.  2.  Paroxysmal atrial fibrillation/flutter.  He appears to be in rate controlled flutter today.  He is completely asymptomatic.  He is on chronic anticoagulation with apixaban.  Continue apixaban along with atenolol.  3.  Hyperlipidemia.  Recent lipid panel showed that his lipids are not well controlled with an  LDL of 143.  The patient is already working on lifestyle changes.  4.  History of PVCs.  None present on his EKG today.    I will plan on seeing the patient back again in 6 months to follow-up on his blood pressures.

## 2021-12-10 DIAGNOSIS — I48.0 PAROXYSMAL ATRIAL FIBRILLATION (HCC): ICD-10-CM

## 2021-12-10 RX ORDER — APIXABAN 5 MG/1
TABLET, FILM COATED ORAL
Qty: 60 TABLET | Refills: 5 | Status: SHIPPED | OUTPATIENT
Start: 2021-12-10 | End: 2022-06-03 | Stop reason: SDUPTHER

## 2021-12-10 NOTE — TELEPHONE ENCOUNTER
Last OV 12/3/21.  Next OV 06/03/22.  Labs 09/04/21.  Does not meet protocol.  [facility]

## 2022-02-04 ENCOUNTER — TELEPHONE (OUTPATIENT)
Dept: CARDIOLOGY | Facility: CLINIC | Age: 62
End: 2022-02-04

## 2022-02-04 NOTE — TELEPHONE ENCOUNTER
[phone]  3:13pm    Pt lm that his eliquis now needs a PA.  Called and spoke razia Rainey and they are faxing the PA info.  [facility]

## 2022-02-07 NOTE — TELEPHONE ENCOUNTER
Received info on PA from [employer].      ID [id number]  [facility] [id number]  Code [id number]  BIN [id number]  PCN [id number]    PH [phone]    Started PA and sent to plan. [facility] [place] 3:48pm

## 2022-02-08 NOTE — TELEPHONE ENCOUNTER
Danielle can you give pt samples/one of the cards that cover commercial insurance patients for eliquis for $10 regardless of PA?  I will call him if need be, just let me know if you have them in office.  [facility]

## 2022-02-08 NOTE — TELEPHONE ENCOUNTER
Steven Atkinson at [facility] and she states [employer] handles this PA.  [phone]  [email or web address]

## 2022-02-14 NOTE — TELEPHONE ENCOUNTER
Pt called, lvm that insurance will not cover the Eliquis, he must try Xarelto first.    Please send in Xarelto if feasible.    Thank you,    Vicky Mcgraw, CMA

## 2022-06-03 ENCOUNTER — OFFICE VISIT (OUTPATIENT)
Dept: CARDIOLOGY | Facility: CLINIC | Age: 62
End: 2022-06-03

## 2022-06-03 VITALS
SYSTOLIC BLOOD PRESSURE: 148 MMHG | BODY MASS INDEX: 29.36 KG/M2 | WEIGHT: 216.8 LBS | HEIGHT: 72 IN | HEART RATE: 70 BPM | DIASTOLIC BLOOD PRESSURE: 88 MMHG | OXYGEN SATURATION: 99 %

## 2022-06-03 DIAGNOSIS — I48.0 PAROXYSMAL ATRIAL FIBRILLATION: Primary | ICD-10-CM

## 2022-06-03 DIAGNOSIS — Z79.01 CHRONIC ANTICOAGULATION: ICD-10-CM

## 2022-06-03 DIAGNOSIS — I49.3 BEAT, PREMATURE VENTRICULAR: ICD-10-CM

## 2022-06-03 DIAGNOSIS — I10 ESSENTIAL HYPERTENSION: ICD-10-CM

## 2022-06-03 PROCEDURE — 99214 OFFICE O/P EST MOD 30 MIN: CPT | Performed by: INTERNAL MEDICINE

## 2022-06-03 PROCEDURE — 93000 ELECTROCARDIOGRAM COMPLETE: CPT | Performed by: INTERNAL MEDICINE

## 2022-06-03 NOTE — PROGRESS NOTES
Subjective:     Encounter Date:06/03/2022      Patient ID: Cody Ferrer is a 62 y.o. male.    Chief Complaint:  History of Present Illness    This is a 62-year-old with a history of paroxysmal atrial fibrillation, hypertension, who presents for follow-up.     The patient presented today for routine 6-month follow-up.  In 12/2021 his blood pressures appear to be elevated.  He also was noted to have an elevated LDL.  Patient admitted that he was working on lifestyle changes.  We opted to see if his blood pressures improved with lifestyle changes before adding a third antihypertensive medication.    Since his last office visit he has successfully lost about 8 pounds.  He has been exercising on a regular basis.  He has been walking the local [place] and notes that his exercise tolerance has been improving.  He is now able to walk uphill and talk at the same time which he had difficulty with previously.  He feels better with these changes.  He has been monitoring her blood pressures at home and notes that her systolic usually run around the 120s or below and his diastolics remain in the 80s.    He denies any chest pain, palpitation, orthopnea, near-syncope or syncope, lower extremity edema, or shortness of breath.  He has been having some issues with obtaining his apixaban.  His insurance company now prefers rivaroxaban.  He has been able to use a co-pay card up till now to continue with the apixaban.     Prior History:  The patient was previously followed by Dr. Valentine.  He 2 in 8/2014 when he presented for symptoms of palpitations.  He had a Holter monitor performed around that time that showed frequent PVCs.  He ultimately underwent an echocardiogram and a stress test that were both unremarkable.  Between 6/2015 and 9/2018 the patient was not seen again in our office.  He saw Dr. Valentine again in 9/2018 after an EKG performed at a health fair revealed evidence of atrial fibrillation.  Patient was relatively asymptomatic  except for some occasional palpitations at night.  He was already rate controlled on atenolol.  He was started on apixaban for anticoagulation.  He was seen 2 additional times in 12/2018 and then again in 6/2019 and was noted to be in atrial fibrillation each of those times.  They did discuss several different options for treatment of his atrial fibrillation including at his most recent office visit with Dr. Valentine in 6/2019 but opted to continue with rate control and anticoagulation since he was asymptomatic.     In 12/2019 he was admitted to [facility] after having symptoms of dyspnea on exertion and flushing feeling.  He stated that he was started on hydralazine for uncontrolled blood pressures.  His work-up during his hospitalization was unremarkable including an echocardiogram that showed systolic function and wall motion with an estimated ejection fraction of 55 to 60%, no significant valvular disease, and normal diastolic function.  His stress test was negative for ischemia.  It was felt that his symptoms were secondary to a reaction to hydralazine.  The medication was stopped at that time and his blood pressures were doing well at home.      I begin following the patient in 6/2020.  At that office visit he reported that his blood pressures were doing well off of the hydralazine with systolics running in the 130s.  Prior to that office visit he was having some issues with urination so he was started on tamsulosin.  He denied any significant changes in his blood pressures with the addition of this medication.         Review of Systems   Constitutional: Positive for weight loss. Negative for malaise/fatigue.   HENT: Negative for hearing loss, hoarse voice, nosebleeds and sore throat.    Eyes: Negative for pain.   Cardiovascular: Negative for chest pain, claudication, cyanosis, dyspnea on exertion, irregular heartbeat, leg swelling, near-syncope, orthopnea, palpitations, paroxysmal nocturnal dyspnea  and syncope.   Respiratory: Negative for shortness of breath and snoring.    Endocrine: Negative for cold intolerance, heat intolerance, polydipsia, polyphagia and polyuria.   Skin: Negative for itching and rash.   Musculoskeletal: Negative for arthritis, falls, joint pain, joint swelling, muscle cramps, muscle weakness and myalgias.   Gastrointestinal: Negative for constipation, diarrhea, dysphagia, heartburn, hematemesis, hematochezia, melena, nausea and vomiting.   Genitourinary: Negative for frequency, hematuria and hesitancy.   Neurological: Negative for excessive daytime sleepiness, dizziness, headaches, light-headedness, numbness and weakness.   Psychiatric/Behavioral: Negative for depression. The patient is not nervous/anxious.          Current Outpatient Medications:   •  atenolol (TENORMIN) 100 MG tablet, 100 mg. TAKE 100 MG BY MOUTH 2 TIMES A DAY, Disp: , Rfl:   •  Eliquis 5 MG tablet tablet, TAKE ONE TABLET BY MOUTH EVERY 12 HOURS, Disp: 60 tablet, Rfl: 5  •  esomeprazole (nexIUM) 40 MG capsule, Take 40 mg by mouth Every Morning Before Breakfast., Disp: , Rfl:   •  lisinopril (PRINIVIL,ZESTRIL) 10 MG tablet, Take 10 mg by mouth 2 (Two) Times a Day., Disp: , Rfl:   •  tamsulosin (FLOMAX) 0.4 MG capsule 24 hr capsule, Take 0.04 mg by mouth Daily., Disp: , Rfl:     Past Medical History:   Diagnosis Date   • HTN (hypertension)    • Hypertension        Past Surgical History:   Procedure Laterality Date   • HERNIA REPAIR     • HERNIA REPAIR      as a child       Family History   Problem Relation Age of Onset   • Heart attack Father    • Hypertension Father    • Cancer Father        Social History     Tobacco Use   • Smoking status: Never Smoker   • Smokeless tobacco: Never Used   • Tobacco comment: caffeine use   Substance Use Topics   • Alcohol use: Yes   • Drug use: No         ECG 12 Lead    Date/Time: 6/3/2022 11:07 AM  Performed by: Paola Howard MD  Authorized by: Paola Howard MD   Comparison:  "compared with previous ECG   Similar to previous ECG  Rhythm: atrial fibrillation               Objective:     Visit Vitals  /88 (BP Location: Right arm, Patient Position: Sitting, Cuff Size: Adult)   Pulse 70   Ht 182.9 cm (72\")   Wt 98.3 kg (216 lb 12.8 oz)   SpO2 99%   BMI 29.40 kg/m²         Constitutional:       Appearance: Normal appearance. Well-developed.   HENT:      Head: Normocephalic and atraumatic.   Neck:      Vascular: No carotid bruit or JVD.   Pulmonary:      Effort: Pulmonary effort is normal.      Breath sounds: Normal breath sounds.   Cardiovascular:      Normal rate. Irregularly irregular rhythm.      No gallop.   Pulses:     Radial: 2+ bilaterally.  Edema:     Peripheral edema absent.   Abdominal:      Palpations: Abdomen is soft.   Skin:     General: Skin is warm and dry.   Neurological:      Mental Status: Alert and oriented to person, place, and time.             Assessment:          Diagnosis Plan   1. Paroxysmal atrial fibrillation (HCC)     2. Beat, premature ventricular     3. Essential hypertension     4. Chronic anticoagulation            Plan:         1.  Persistent atrial fibrillation.  Previously paroxysmal but now he appears to be in persistent atrial fibrillation.  He is rate controlled.  He is on anticoagulation with apixaban.  I will go ahead and refill this medication and continue it as long as he is able to afford it with the use of the co-pay card.  If he is unable to use his card in the future and we will go ahead and switch him to rivaroxaban.  2.  Hypertension.  Mildly elevated in the office today but patient reported blood pressure monitor at home.  Encourage appointment with no changes.  3.  Hyperlipidemia.  Monitored by Dr. Brown.  His last .  Hopefully this has improved with lifestyle changes.  4.  History of PVCs.  None present at this time.    I will have the patient back again in 6 months.       "

## 2022-12-19 DIAGNOSIS — I48.0 PAROXYSMAL ATRIAL FIBRILLATION: ICD-10-CM

## 2022-12-19 RX ORDER — APIXABAN 5 MG/1
TABLET, FILM COATED ORAL
Qty: 60 TABLET | Refills: 5 | Status: SHIPPED | OUTPATIENT
Start: 2022-12-19 | End: 2023-01-24 | Stop reason: SDUPTHER

## 2022-12-19 NOTE — TELEPHONE ENCOUNTER
I will refill for now.    Please get copy of last CBC and BMP from [facility].  thanks

## 2023-01-24 DIAGNOSIS — I48.0 PAROXYSMAL ATRIAL FIBRILLATION: ICD-10-CM

## 2023-01-24 NOTE — TELEPHONE ENCOUNTER
Caller: GavinraCody    Relationship: Self    Best call back number: [phone]    Requested Prescriptions:   Requested Prescriptions     Pending Prescriptions Disp Refills   • apixaban (Eliquis) 5 MG tablet tablet 60 tablet 5     Sig: Take 1 tablet by mouth Every 12 (Twelve) Hours.        Pharmacy where request should be sent: [facility] [address] [facility] [address]      Additional details provided by patient: PATIENT [place] OUT [place] TOWN AND NEEDS THE REFILL SENT TO THE PHARMACY LISTED ABOVE. HE HAS 2 DAYS OF THE MEDIATION. PLEASE CONTACT PATIENT WHEN REQUEST IS COMPLETE. THANK YOU    Does the patient have less than a 3 day supply:  [x] Yes  [] No    Would you like a call back once the refill request has been completed: [x] Yes [] No    If the office needs to give you a call back, can they leave a voicemail: [x] Yes [] No    Jemal Pimentel Rep   01/24/23 10:06 EST

## 2023-01-25 ENCOUNTER — TELEPHONE (OUTPATIENT)
Dept: CARDIOLOGY | Facility: CLINIC | Age: 63
End: 2023-01-25
Payer: COMMERCIAL

## 2023-01-25 DIAGNOSIS — I48.0 PAROXYSMAL ATRIAL FIBRILLATION: ICD-10-CM

## 2023-01-25 NOTE — TELEPHONE ENCOUNTER
Caller: Cody Ferrer    Relationship to patient: Self    Best call back number: [phone]    Patient is needing: PATIENT HAD ELIQUIS 5 MG REFILLS SENT IN YESTERDAY 1.25.23 BUT IT WAS SENT TO [facility]. PATIENT IS CURRENTLY IN [place]. NEEDS TO BE SENT TO         [facility] [address] [facility] [address]    PHONE

## 2023-10-11 NOTE — PROGRESS NOTES
Subjective:     Encounter Date:10/12/2023      Patient ID: Cody Ferrer is a 63 y.o. male.    Chief Complaint:  History of Present Illness    This is a 62-year-old with paroxysmal atrial fibrillation, hypertension, who presents for follow-up.     In 12/2021 his blood pressures appear to be elevated.  He also was noted to have an elevated LDL.  Patient admitted that he was working on lifestyle changes.  We opted to see if his blood pressures improved with lifestyle changes before adding a third antihypertensive medication.    Back in follow-up in 6/2022 he had lost a few pounds.  He was exercising on a regular basis without any significant issues.  Blood pressures were well controlled.  He reported he was having issues obtaining apixaban because his insurance company now preferably covered rivaroxaban.  At that office visit he appeared to be in persistent atrial fibrillation.  We opted to continue with the apixaban as long as he could use a co-pay card and consider switching to rivaroxaban if he experienced any issues getting it covered.      He presents today for routine annual follow-up.  He feels well.  He continues to exercise on regular basis without any significant issues.  He denies any chest pain, shortness of breath, palpitations, orthopnea, near-syncope or syncope or lower extremity swelling.  He reports that the apixaban is still quite expensive but would rather stay on this medication and switching to something else at this time.       Prior History:  The patient was previously followed by Dr. Valentine.  He 2 in 8/2014 when he presented for symptoms of palpitations.  He had a Holter monitor performed around that time that showed frequent PVCs.  He ultimately underwent an echocardiogram and a stress test that were both unremarkable.  Between 6/2015 and 9/2018 the patient was not seen again in our office.  He saw Dr. Valentine again in 9/2018 after an EKG performed at a health fair revealed evidence of atrial  fibrillation.  Patient was relatively asymptomatic except for some occasional palpitations at night.  He was already rate controlled on atenolol.  He was started on apixaban for anticoagulation.  He was seen 2 additional times in 12/2018 and then again in 6/2019 and was noted to be in atrial fibrillation each of those times.  They did discuss several different options for treatment of his atrial fibrillation including at his most recent office visit with Dr. Valentine in 6/2019 but opted to continue with rate control and anticoagulation since he was asymptomatic.     In 12/2019 he was admitted to [facility] after having symptoms of dyspnea on exertion and flushing feeling.  He stated that he was started on hydralazine for uncontrolled blood pressures.  His work-up during his hospitalization was unremarkable including an echocardiogram that showed systolic function and wall motion with an estimated ejection fraction of 55 to 60%, no significant valvular disease, and normal diastolic function.  His stress test was negative for ischemia.  It was felt that his symptoms were secondary to a reaction to hydralazine.  The medication was stopped at that time and his blood pressures were doing well at home.      I begin following the patient in 6/2020.  At that office visit he reported that his blood pressures were doing well off of the hydralazine with systolics running in the 130s.  Prior to that office visit he was having some issues with urination so he was started on tamsulosin.  He denied any significant changes in his blood pressures with the addition of this medication.          Review of Systems   Constitutional: Negative for malaise/fatigue.   HENT:  Negative for hearing loss, hoarse voice, nosebleeds and sore throat.    Eyes:  Negative for pain.   Cardiovascular:  Negative for chest pain, claudication, cyanosis, dyspnea on exertion, irregular heartbeat, leg swelling, near-syncope, orthopnea, palpitations,  paroxysmal nocturnal dyspnea and syncope.   Respiratory:  Negative for shortness of breath and snoring.    Endocrine: Negative for cold intolerance, heat intolerance, polydipsia, polyphagia and polyuria.   Skin:  Negative for itching and rash.   Musculoskeletal:  Negative for arthritis, falls, joint pain, joint swelling, muscle cramps, muscle weakness and myalgias.   Gastrointestinal:  Negative for constipation, diarrhea, dysphagia, heartburn, hematemesis, hematochezia, melena, nausea and vomiting.   Genitourinary:  Negative for frequency, hematuria and hesitancy.   Neurological:  Negative for excessive daytime sleepiness, dizziness, headaches, light-headedness, numbness and weakness.   Psychiatric/Behavioral:  Negative for depression. The patient is not nervous/anxious.          Current Outpatient Medications:     atenolol (TENORMIN) 100 MG tablet, 1 tablet. TAKE 100 MG BY MOUTH 2 TIMES A DAY, Disp: , Rfl:     esomeprazole (nexIUM) 40 MG capsule, Take 1 capsule by mouth Every Morning Before Breakfast., Disp: , Rfl:     lisinopril (PRINIVIL,ZESTRIL) 10 MG tablet, Take 1 tablet by mouth 2 (Two) Times a Day., Disp: , Rfl:     methylPREDNISolone (MEDROL) 4 MG dose pack, , Disp: , Rfl:     tamsulosin (FLOMAX) 0.4 MG capsule 24 hr capsule, Take 0.04 mg by mouth Daily., Disp: , Rfl:     triamcinolone (KENALOG) 0.1 % cream, , Disp: , Rfl:     apixaban (Eliquis) 5 MG tablet tablet, Take 1 tablet by mouth Every 12 (Twelve) Hours., Disp: 60 tablet, Rfl: 5    Past Medical History:   Diagnosis Date    HTN (hypertension)     Hypertension        Past Surgical History:   Procedure Laterality Date    HERNIA REPAIR      HERNIA REPAIR      as a child       Family History   Problem Relation Age of Onset    Heart attack Father     Hypertension Father     Cancer Father        Social History     Tobacco Use    Smoking status: Never    Smokeless tobacco: Never    Tobacco comments:     caffeine use   Substance Use Topics    Alcohol use:  "Yes    Drug use: No         ECG 12 Lead    Date/Time: 10/12/2023 3:51 PM  Performed by: Paola Howard MD    Authorized by: Paola Howard MD  Comparison: compared with previous ECG   Similar to previous ECG  Rhythm: sinus rhythm  Ectopy: atrial premature contractions             Objective:     Visit Vitals  /80   Pulse 53   Ht 182.9 cm (72\")   Wt 100 kg (221 lb)   SpO2 97%   BMI 29.97 kg/mý         Constitutional:       Appearance: Normal appearance. Well-developed.   HENT:      Head: Normocephalic and atraumatic.   Neck:      Vascular: No carotid bruit or JVD.   Pulmonary:      Effort: Pulmonary effort is normal.      Breath sounds: Normal breath sounds.   Cardiovascular:      Normal rate. Regular rhythm.      No gallop.    Pulses:     Radial: 2+ bilaterally.  Edema:     Peripheral edema absent.   Abdominal:      Palpations: Abdomen is soft.   Skin:     General: Skin is warm and dry.   Neurological:      Mental Status: Alert and oriented to person, place, and time.             Assessment:          Diagnosis Plan   1. Paroxysmal atrial fibrillation        2. Beat, premature ventricular        3. Essential hypertension               Plan:       1.  Paroxysmal atrial fibrillation.  His last office visit was concerned he was in persistent atrial fibrillation.  However he is back in sinus rhythm today.  Continue current medical management including anticoagulation with apixaban.  2.  Hypertension.  Well-controlled on his current regimen of medications.  Continue the same.  3.  Hyperlipidemia.  4.  History of PVCs.  None present at this time.      We will plan on seeing the patient back again in 1 year or sooner if further issues arise.         "

## 2023-10-12 ENCOUNTER — OFFICE VISIT (OUTPATIENT)
Age: 63
End: 2023-10-12
Payer: MEDICARE

## 2023-10-12 VITALS
OXYGEN SATURATION: 97 % | DIASTOLIC BLOOD PRESSURE: 80 MMHG | BODY MASS INDEX: 29.93 KG/M2 | WEIGHT: 221 LBS | SYSTOLIC BLOOD PRESSURE: 120 MMHG | HEIGHT: 72 IN | HEART RATE: 53 BPM

## 2023-10-12 DIAGNOSIS — I10 ESSENTIAL HYPERTENSION: ICD-10-CM

## 2023-10-12 DIAGNOSIS — I48.0 PAROXYSMAL ATRIAL FIBRILLATION: ICD-10-CM

## 2023-10-12 DIAGNOSIS — I48.0 PAROXYSMAL ATRIAL FIBRILLATION: Primary | ICD-10-CM

## 2023-10-12 DIAGNOSIS — I49.3 BEAT, PREMATURE VENTRICULAR: ICD-10-CM

## 2023-10-12 PROCEDURE — 3074F SYST BP LT 130 MM HG: CPT | Performed by: INTERNAL MEDICINE

## 2023-10-12 PROCEDURE — 1160F RVW MEDS BY RX/DR IN RCRD: CPT | Performed by: INTERNAL MEDICINE

## 2023-10-12 PROCEDURE — 99214 OFFICE O/P EST MOD 30 MIN: CPT | Performed by: INTERNAL MEDICINE

## 2023-10-12 PROCEDURE — 93000 ELECTROCARDIOGRAM COMPLETE: CPT | Performed by: INTERNAL MEDICINE

## 2023-10-12 PROCEDURE — 1159F MED LIST DOCD IN RCRD: CPT | Performed by: INTERNAL MEDICINE

## 2023-10-12 PROCEDURE — 3079F DIAST BP 80-89 MM HG: CPT | Performed by: INTERNAL MEDICINE

## 2023-10-12 RX ORDER — TRIAMCINOLONE ACETONIDE 1 MG/G
CREAM TOPICAL
COMMUNITY
Start: 2023-06-23

## 2023-10-12 RX ORDER — METHYLPREDNISOLONE 4 MG/1
TABLET ORAL
COMMUNITY
Start: 2023-07-25

## 2023-12-19 DIAGNOSIS — I48.0 PAROXYSMAL ATRIAL FIBRILLATION: ICD-10-CM

## 2023-12-22 RX ORDER — APIXABAN 5 MG/1
5 TABLET, FILM COATED ORAL EVERY 12 HOURS
Qty: 60 TABLET | Refills: 2 | Status: SHIPPED | OUTPATIENT
Start: 2023-12-22

## 2024-03-27 DIAGNOSIS — I48.0 PAROXYSMAL ATRIAL FIBRILLATION: ICD-10-CM

## 2024-03-27 RX ORDER — APIXABAN 5 MG/1
5 TABLET, FILM COATED ORAL EVERY 12 HOURS
Qty: 60 TABLET | Refills: 5 | Status: SHIPPED | OUTPATIENT
Start: 2024-03-27

## 2024-09-18 DIAGNOSIS — I48.0 PAROXYSMAL ATRIAL FIBRILLATION: ICD-10-CM

## 2024-09-19 RX ORDER — APIXABAN 5 MG/1
5 TABLET, FILM COATED ORAL EVERY 12 HOURS
Qty: 60 TABLET | Refills: 5 | Status: SHIPPED | OUTPATIENT
Start: 2024-09-19 | End: 2024-09-23

## 2024-09-21 DIAGNOSIS — I48.0 PAROXYSMAL ATRIAL FIBRILLATION: ICD-10-CM

## 2024-09-23 RX ORDER — APIXABAN 5 MG/1
5 TABLET, FILM COATED ORAL EVERY 12 HOURS
Qty: 60 TABLET | Refills: 5 | Status: SHIPPED | OUTPATIENT
Start: 2024-09-23

## 2024-10-14 NOTE — PROGRESS NOTES
Subjective:     Encounter Date:10/15/2024      Patient ID: Cody Ferrer is a 64 y.o. male.    Chief Complaint:  History of Present Illness    This is a 64-year-old with paroxysmal atrial fibrillation, hypertension, who presents for follow-up.     In 12/2021 his blood pressures appear to be elevated.  He also was noted to have an elevated LDL.  Patient admitted that he was working on lifestyle changes.  We opted to see if his blood pressures improved with lifestyle changes before adding a third antihypertensive medication.     Back in follow-up in 6/2022 he had lost a few pounds.  He was exercising on a regular basis without any significant issues.  Blood pressures were well controlled.  He reported he was having issues obtaining apixaban because his insurance company now preferably covered rivaroxaban.  At that office visit he appeared to be in persistent atrial fibrillation.  We opted to continue with the apixaban as long as he could use a co-pay card and consider switching to rivaroxaban if he experienced any issues getting it covered.       I saw the patient back in 10/2023 at which time he was doing well.  He is exercising without any issues.  The apixaban was still expensive but the patient wanted to continue with the apixaban.    He presents today for annual follow-up.  He continues to feel well.  He denies any chest pain, shortness of breath, palpitation, orthopnea, near-syncope or syncope or lower extremity swelling.  He appears to be back in atrial fibrillation or flutter today but no associated symptoms.  He continues to exercise by walking and hiking on a regular basis without any significant issues.        Prior History:  The patient was previously followed by Dr. Valentine.  He 2 in 8/2014 when he presented for symptoms of palpitations.  He had a Holter monitor performed around that time that showed frequent PVCs.  He ultimately underwent an echocardiogram and a stress test that were both unremarkable.   Between 6/2015 and 9/2018 the patient was not seen again in our office.  He saw Dr. Valentine again in 9/2018 after an EKG performed at a health fair revealed evidence of atrial fibrillation.  Patient was relatively asymptomatic except for some occasional palpitations at night.  He was already rate controlled on atenolol.  He was started on apixaban for anticoagulation.  He was seen 2 additional times in 12/2018 and then again in 6/2019 and was noted to be in atrial fibrillation each of those times.  They did discuss several different options for treatment of his atrial fibrillation including at his most recent office visit with Dr. Valentine in 6/2019 but opted to continue with rate control and anticoagulation since he was asymptomatic.     In 12/2019 he was admitted to [facility] after having symptoms of dyspnea on exertion and flushing feeling.  He stated that he was started on hydralazine for uncontrolled blood pressures.  His work-up during his hospitalization was unremarkable including an echocardiogram that showed systolic function and wall motion with an estimated ejection fraction of 55 to 60%, no significant valvular disease, and normal diastolic function.  His stress test was negative for ischemia.  It was felt that his symptoms were secondary to a reaction to hydralazine.  The medication was stopped at that time and his blood pressures were doing well at home.      I begin following the patient in 6/2020.  At that office visit he reported that his blood pressures were doing well off of the hydralazine with systolics running in the 130s.  Prior to that office visit he was having some issues with urination so he was started on tamsulosin.  He denied any significant changes in his blood pressures with the addition of this medication.       Review of Systems   Constitutional: Negative for malaise/fatigue.   HENT:  Negative for hearing loss, hoarse voice, nosebleeds and sore throat.    Eyes:  Negative for  pain.   Cardiovascular:  Negative for chest pain, claudication, cyanosis, dyspnea on exertion, irregular heartbeat, leg swelling, near-syncope, orthopnea, palpitations, paroxysmal nocturnal dyspnea and syncope.   Respiratory:  Negative for shortness of breath and snoring.    Endocrine: Negative for cold intolerance, heat intolerance, polydipsia, polyphagia and polyuria.   Skin:  Negative for itching and rash.   Musculoskeletal:  Negative for arthritis, falls, joint pain, joint swelling, muscle cramps, muscle weakness and myalgias.   Gastrointestinal:  Negative for constipation, diarrhea, dysphagia, heartburn, hematemesis, hematochezia, melena, nausea and vomiting.   Genitourinary:  Negative for frequency, hematuria and hesitancy.   Neurological:  Negative for excessive daytime sleepiness, dizziness, headaches, light-headedness, numbness and weakness.   Psychiatric/Behavioral:  Negative for depression. The patient is not nervous/anxious.          Current Outpatient Medications:     atenolol (TENORMIN) 100 MG tablet, 1 tablet. TAKE 100 MG BY MOUTH 2 TIMES A DAY, Disp: , Rfl:     Eliquis 5 MG tablet tablet, TAKE ONE TABLET BY MOUTH EVERY 12 HOURS, Disp: 60 tablet, Rfl: 5    esomeprazole (nexIUM) 40 MG capsule, Take 1 capsule by mouth Every Morning Before Breakfast., Disp: , Rfl:     lisinopril (PRINIVIL,ZESTRIL) 10 MG tablet, Take 1 tablet by mouth 2 (Two) Times a Day., Disp: , Rfl:     methylPREDNISolone (MEDROL) 4 MG dose pack, , Disp: , Rfl:     tamsulosin (FLOMAX) 0.4 MG capsule 24 hr capsule, Take 0.04 mg by mouth Daily., Disp: , Rfl:     triamcinolone (KENALOG) 0.1 % cream, , Disp: , Rfl:     Past Medical History:   Diagnosis Date    HTN (hypertension)     Hypertension        Past Surgical History:   Procedure Laterality Date    HERNIA REPAIR      HERNIA REPAIR      as a child       Family History   Problem Relation Age of Onset    Heart attack Father     Hypertension Father     Cancer Father        Social  "History     Tobacco Use    Smoking status: Never    Smokeless tobacco: Never    Tobacco comments:     caffeine use   Vaping Use    Vaping status: Never Used   Substance Use Topics    Alcohol use: Yes    Drug use: No         ECG 12 Lead    Date/Time: 10/15/2024 1:12 PM  Performed by: Paola Howard MD    Authorized by: Paola Howard MD  Comparison: compared with previous ECG   Comparison to previous ECG: Atrial flutter is new compared to prior tracing  Rhythm: atrial flutter             Objective:     Visit Vitals  /77 (BP Location: Left arm, Patient Position: Sitting, Cuff Size: Adult)   Pulse 71   Ht 182.9 cm (72\")   Wt 99.7 kg (219 lb 12.8 oz)   SpO2 98%   BMI 29.81 kg/m²         Constitutional:       Appearance: Normal appearance. Well-developed.   HENT:      Head: Normocephalic and atraumatic.   Neck:      Vascular: No carotid bruit or JVD.   Pulmonary:      Effort: Pulmonary effort is normal.      Breath sounds: Normal breath sounds.   Cardiovascular:      Normal rate. Irregularly irregular rhythm.      No gallop.    Pulses:     Radial: 2+ bilaterally.  Edema:     Peripheral edema absent.   Abdominal:      Palpations: Abdomen is soft.   Skin:     General: Skin is warm and dry.   Neurological:      Mental Status: Alert and oriented to person, place, and time.             Assessment:          Diagnosis Plan   1. Beat, premature ventricular        2. Paroxysmal atrial fibrillation        3. Essential hypertension        4. Chronic anticoagulation               Plan:       1.  Paroxysmal atrial fibrillation.  Appears to be in atrial flutter or coarse atrial fibrillation today.  Asymptomatic and rates are well-controlled on atenolol.  At this point cannot really say if he is now in persistent atrial fibrillation or if this is still paroxysmal.  Regardless he is asymptomatic, rate controlled and on anticoagulation with apixaban.  Continue current management.  2.  History of PVCs.  None present at this " time.  3.  Hypertension.  Well-controlled on his current regimen medications.    Will plan on seeing the patient back again in 1 year or sooner if further issues arise.

## 2024-10-15 ENCOUNTER — OFFICE VISIT (OUTPATIENT)
Age: 64
End: 2024-10-15
Payer: MEDICARE

## 2024-10-15 VITALS
SYSTOLIC BLOOD PRESSURE: 130 MMHG | DIASTOLIC BLOOD PRESSURE: 77 MMHG | HEART RATE: 71 BPM | OXYGEN SATURATION: 98 % | WEIGHT: 219.8 LBS | BODY MASS INDEX: 29.77 KG/M2 | HEIGHT: 72 IN

## 2024-10-15 DIAGNOSIS — I49.3 BEAT, PREMATURE VENTRICULAR: Primary | ICD-10-CM

## 2024-10-15 DIAGNOSIS — Z79.01 CHRONIC ANTICOAGULATION: ICD-10-CM

## 2024-10-15 DIAGNOSIS — I10 ESSENTIAL HYPERTENSION: ICD-10-CM

## 2024-10-15 DIAGNOSIS — I48.0 PAROXYSMAL ATRIAL FIBRILLATION: ICD-10-CM

## 2025-08-09 DIAGNOSIS — I48.0 PAROXYSMAL ATRIAL FIBRILLATION: ICD-10-CM

## 2025-08-11 RX ORDER — APIXABAN 5 MG/1
5 TABLET, FILM COATED ORAL
Qty: 60 TABLET | Refills: 5 | Status: SHIPPED | OUTPATIENT
Start: 2025-08-11